# Patient Record
Sex: FEMALE | URBAN - METROPOLITAN AREA
[De-identification: names, ages, dates, MRNs, and addresses within clinical notes are randomized per-mention and may not be internally consistent; named-entity substitution may affect disease eponyms.]

---

## 2020-07-29 ENCOUNTER — APPOINTMENT (RX ONLY)
Dept: URBAN - METROPOLITAN AREA CLINIC 184 | Facility: CLINIC | Age: 65
Setting detail: DERMATOLOGY
End: 2020-07-29

## 2020-07-29 DIAGNOSIS — B07.8 OTHER VIRAL WARTS: ICD-10-CM

## 2020-07-29 DIAGNOSIS — L30.9 DERMATITIS, UNSPECIFIED: ICD-10-CM

## 2020-07-29 PROCEDURE — ? LIQUID NITROGEN

## 2020-07-29 PROCEDURE — 99202 OFFICE O/P NEW SF 15 MIN: CPT | Mod: 25

## 2020-07-29 PROCEDURE — 17110 DESTRUCTION B9 LES UP TO 14: CPT

## 2020-07-29 PROCEDURE — ? COUNSELING

## 2020-07-29 PROCEDURE — ? PRESCRIPTION

## 2020-07-29 RX ORDER — TRIAMCINOLONE ACETONIDE 1 MG/G
CREAM TOPICAL
Qty: 1 | Refills: 1 | Status: ERX | COMMUNITY
Start: 2020-07-29

## 2020-07-29 RX ADMIN — TRIAMCINOLONE ACETONIDE: 1 CREAM TOPICAL at 00:00

## 2020-07-29 ASSESSMENT — LOCATION SIMPLE DESCRIPTION DERM
LOCATION SIMPLE: RIGHT POSTERIOR UPPER ARM
LOCATION SIMPLE: RIGHT CHEEK

## 2020-07-29 ASSESSMENT — LOCATION DETAILED DESCRIPTION DERM
LOCATION DETAILED: RIGHT MEDIAL MALAR CHEEK
LOCATION DETAILED: RIGHT PROXIMAL POSTERIOR UPPER ARM

## 2020-07-29 ASSESSMENT — LOCATION ZONE DERM
LOCATION ZONE: ARM
LOCATION ZONE: FACE

## 2025-04-21 NOTE — PROCEDURE: COUNSELING
Copied from CRM #[id number]. Topic: MW Medication/Rx - MW Rx Refill  >> Apr 21, 2025 12:28 PM Isha MEZA wrote:  Dayana Zamora called to request a medication refill and currently has medication during working hrs.  Medication is:    Listed on Med Management list. Pended medication. Selected 'Wrap Up CRM' and created new Refill Med Encounter after clicking 'Convert to Clinical Call'. Sent Med template and routed as routine priority per Care Site Dept KB page for Med Refill Working Hrs support to appropriate clinical pool. Readback full message.-- DO NOT REPLY / DO NOT REPLY ALL --  -- This inbox is not monitored. If this was sent to the wrong provider or department, reroute message to P [facility] Reroute pool. --  -- Message is from [facility] ([facility]) --      Message Type:  Refill Medication   Refill request for Pended medication named: levothyroxine 125 MCG tablet   Preferred pharmacy verified, and selected.   [id number] - [place], [place] - [address] AT [address]    Is the patient OUT of Medication?  No Medication Refills handled by [facility] Clinical - route as routine priority to [facility] CLINICAL MSG pool. Patient has been advised it will be addressed within 2-3 business days.     Message: Refill                   Detail Level: Zone